# Patient Record
Sex: FEMALE | Race: WHITE | NOT HISPANIC OR LATINO | Employment: UNEMPLOYED | URBAN - METROPOLITAN AREA
[De-identification: names, ages, dates, MRNs, and addresses within clinical notes are randomized per-mention and may not be internally consistent; named-entity substitution may affect disease eponyms.]

---

## 2017-02-01 ENCOUNTER — CONVERSION ENCOUNTER (OUTPATIENT)
Dept: MAMMOGRAPHY | Facility: CLINIC | Age: 52
End: 2017-02-01

## 2017-06-23 ENCOUNTER — HOSPITAL ENCOUNTER (OUTPATIENT)
Dept: SLEEP CENTER | Facility: CLINIC | Age: 52
Discharge: HOME | End: 2017-06-23
Payer: COMMERCIAL

## 2017-06-23 ENCOUNTER — TRANSCRIBE ORDERS (OUTPATIENT)
Dept: SLEEP CENTER | Facility: CLINIC | Age: 52
End: 2017-06-23

## 2017-06-23 DIAGNOSIS — G47.33 OSA (OBSTRUCTIVE SLEEP APNEA): Primary | ICD-10-CM

## 2017-08-21 ENCOUNTER — HOSPITAL ENCOUNTER (OUTPATIENT)
Dept: SLEEP CENTER | Facility: CLINIC | Age: 52
Discharge: HOME | End: 2017-08-21
Payer: COMMERCIAL

## 2017-08-21 DIAGNOSIS — G47.33 OSA (OBSTRUCTIVE SLEEP APNEA): ICD-10-CM

## 2017-08-21 PROCEDURE — 95810 POLYSOM 6/> YRS 4/> PARAM: CPT

## 2017-08-22 ENCOUNTER — TRANSCRIBE ORDERS (OUTPATIENT)
Dept: SLEEP CENTER | Facility: CLINIC | Age: 52
End: 2017-08-22

## 2017-08-22 DIAGNOSIS — R09.02 HYPOXIA: Primary | ICD-10-CM

## 2017-09-15 ENCOUNTER — HOSPITAL ENCOUNTER (OUTPATIENT)
Dept: SLEEP CENTER | Facility: CLINIC | Age: 52
Discharge: HOME | End: 2017-09-15
Payer: COMMERCIAL

## 2017-09-15 DIAGNOSIS — R09.02 HYPOXIA: ICD-10-CM

## 2018-08-10 ENCOUNTER — APPOINTMENT (EMERGENCY)
Dept: RADIOLOGY | Facility: HOSPITAL | Age: 53
DRG: 812 | End: 2018-08-10
Payer: COMMERCIAL

## 2018-08-10 ENCOUNTER — APPOINTMENT (INPATIENT)
Dept: CT IMAGING | Facility: HOSPITAL | Age: 53
DRG: 812 | End: 2018-08-10
Payer: COMMERCIAL

## 2018-08-10 ENCOUNTER — HOSPITAL ENCOUNTER (INPATIENT)
Facility: HOSPITAL | Age: 53
LOS: 2 days | Discharge: HOME | DRG: 812 | End: 2018-08-12
Attending: EMERGENCY MEDICINE | Admitting: HOSPITALIST
Payer: COMMERCIAL

## 2018-08-10 DIAGNOSIS — G93.40 ACUTE ENCEPHALOPATHY: ICD-10-CM

## 2018-08-10 DIAGNOSIS — T50.901A OVERDOSE: Primary | ICD-10-CM

## 2018-08-10 DIAGNOSIS — F41.9 ANXIETY: ICD-10-CM

## 2018-08-10 DIAGNOSIS — G89.29 CHRONIC PAIN: ICD-10-CM

## 2018-08-10 PROBLEM — E66.09 OBESITY DUE TO EXCESS CALORIES: Status: ACTIVE | Noted: 2018-08-10

## 2018-08-10 PROBLEM — E66.09 OBESITY DUE TO EXCESS CALORIES: Chronic | Status: ACTIVE | Noted: 2018-08-10

## 2018-08-10 PROBLEM — G47.33 OBSTRUCTIVE SLEEP APNEA: Chronic | Status: ACTIVE | Noted: 2018-08-10

## 2018-08-10 PROBLEM — G47.33 OBSTRUCTIVE SLEEP APNEA: Status: ACTIVE | Noted: 2018-08-10

## 2018-08-10 PROBLEM — E66.09 OBESITY DUE TO EXCESS CALORIES: Chronic | Status: RESOLVED | Noted: 2018-08-10 | Resolved: 2018-08-10

## 2018-08-10 PROBLEM — J96.01 ACUTE RESPIRATORY FAILURE WITH HYPOXIA (HCC): Status: ACTIVE | Noted: 2018-08-10

## 2018-08-10 LAB
ALBUMIN SERPL BCP-MCNC: 3.4 G/DL (ref 3–5.2)
ALP SERPL-CCNC: 66 U/L (ref 43–122)
ALT SERPL W P-5'-P-CCNC: 24 U/L (ref 9–52)
AMPHETAMINES SERPL QL SCN: NEGATIVE
ANION GAP SERPL CALCULATED.3IONS-SCNC: 5 MMOL/L (ref 5–14)
ANISOCYTOSIS BLD QL SMEAR: PRESENT
APAP SERPL-MCNC: <10 UG/ML (ref 10–30)
AST SERPL W P-5'-P-CCNC: 19 U/L (ref 14–36)
BACTERIA UR QL AUTO: ABNORMAL /HPF
BARBITURATES UR QL: NEGATIVE
BENZODIAZ UR QL: POSITIVE
BILIRUB SERPL-MCNC: 0.4 MG/DL
BILIRUB UR QL STRIP: NEGATIVE
BUN SERPL-MCNC: 10 MG/DL (ref 5–25)
CALCIUM SERPL-MCNC: 9 MG/DL (ref 8.4–10.2)
CHLORIDE SERPL-SCNC: 100 MMOL/L (ref 97–108)
CLARITY UR: CLEAR
CO2 SERPL-SCNC: 37 MMOL/L (ref 22–30)
COCAINE UR QL: NEGATIVE
COLOR UR: ABNORMAL
CREAT SERPL-MCNC: 0.78 MG/DL (ref 0.6–1.2)
EOSINOPHIL # BLD AUTO: 0.13 THOUSAND/UL (ref 0–0.4)
EOSINOPHIL NFR BLD MANUAL: 2 % (ref 0–6)
ERYTHROCYTE [DISTWIDTH] IN BLOOD BY AUTOMATED COUNT: 16.5 %
EXT PREG TEST URINE: NORMAL
GFR SERPL CREATININE-BSD FRML MDRD: 88 ML/MIN/1.73SQ M
GLUCOSE SERPL-MCNC: 92 MG/DL (ref 70–99)
GLUCOSE UR STRIP-MCNC: NEGATIVE MG/DL
HCG SERPL QL: NEGATIVE
HCT VFR BLD AUTO: 46.6 % (ref 36–46)
HGB BLD-MCNC: 15.2 G/DL (ref 12–16)
HGB UR QL STRIP.AUTO: NEGATIVE
KETONES UR STRIP-MCNC: NEGATIVE MG/DL
LEUKOCYTE ESTERASE UR QL STRIP: NEGATIVE
LYMPHOCYTES # BLD AUTO: 3.07 THOUSAND/UL (ref 0.5–4)
LYMPHOCYTES # BLD AUTO: 48 % (ref 20–50)
MCH RBC QN AUTO: 32.1 PG (ref 26–34)
MCHC RBC AUTO-ENTMCNC: 32.6 G/DL (ref 31–36)
MCV RBC AUTO: 99 FL (ref 80–100)
METHADONE UR QL: NEGATIVE
MONOCYTES # BLD AUTO: 0.9 THOUSAND/UL (ref 0.2–0.9)
MONOCYTES NFR BLD AUTO: 14 % (ref 1–10)
NEUTS SEG # BLD: 2.3 THOUSAND/UL (ref 1.8–7.8)
NEUTS SEG NFR BLD AUTO: 36 %
NITRITE UR QL STRIP: NEGATIVE
NON-SQ EPI CELLS URNS QL MICRO: ABNORMAL /HPF
OPIATES UR QL SCN: POSITIVE
PCP UR QL: NEGATIVE
PH UR STRIP.AUTO: 8 [PH] (ref 4.5–8)
PLATELET # BLD AUTO: 159 THOUSANDS/UL (ref 150–450)
PLATELET BLD QL SMEAR: ADEQUATE
PMV BLD AUTO: 9.6 FL (ref 8.9–12.7)
POTASSIUM SERPL-SCNC: 3.7 MMOL/L (ref 3.6–5)
PROT SERPL-MCNC: 6.6 G/DL (ref 5.9–8.4)
PROT UR STRIP-MCNC: NEGATIVE MG/DL
RBC # BLD AUTO: 4.73 MILLION/UL (ref 4–5.2)
RBC #/AREA URNS AUTO: ABNORMAL /HPF
RBC MORPH BLD: ABNORMAL
SALICYLATES SERPL-MCNC: <1 MG/DL (ref 10–30)
SODIUM SERPL-SCNC: 142 MMOL/L (ref 137–147)
SP GR UR STRIP.AUTO: 1.01 (ref 1–1.04)
THC UR QL: NEGATIVE
TOTAL CELLS COUNTED SPEC: 100
UROBILINOGEN UA: NEGATIVE MG/DL
WBC # BLD AUTO: 6.4 THOUSAND/UL (ref 4.5–11)
WBC #/AREA URNS AUTO: ABNORMAL /HPF

## 2018-08-10 PROCEDURE — 84703 CHORIONIC GONADOTROPIN ASSAY: CPT | Performed by: EMERGENCY MEDICINE

## 2018-08-10 PROCEDURE — 70450 CT HEAD/BRAIN W/O DYE: CPT

## 2018-08-10 PROCEDURE — 80307 DRUG TEST PRSMV CHEM ANLYZR: CPT | Performed by: EMERGENCY MEDICINE

## 2018-08-10 PROCEDURE — 71045 X-RAY EXAM CHEST 1 VIEW: CPT

## 2018-08-10 PROCEDURE — 36415 COLL VENOUS BLD VENIPUNCTURE: CPT | Performed by: EMERGENCY MEDICINE

## 2018-08-10 PROCEDURE — 99285 EMERGENCY DEPT VISIT HI MDM: CPT

## 2018-08-10 PROCEDURE — 81001 URINALYSIS AUTO W/SCOPE: CPT | Performed by: EMERGENCY MEDICINE

## 2018-08-10 PROCEDURE — 96376 TX/PRO/DX INJ SAME DRUG ADON: CPT

## 2018-08-10 PROCEDURE — 80329 ANALGESICS NON-OPIOID 1 OR 2: CPT | Performed by: EMERGENCY MEDICINE

## 2018-08-10 PROCEDURE — 99291 CRITICAL CARE FIRST HOUR: CPT | Performed by: HOSPITALIST

## 2018-08-10 PROCEDURE — 80053 COMPREHEN METABOLIC PANEL: CPT | Performed by: EMERGENCY MEDICINE

## 2018-08-10 PROCEDURE — 85007 BL SMEAR W/DIFF WBC COUNT: CPT | Performed by: EMERGENCY MEDICINE

## 2018-08-10 PROCEDURE — 85027 COMPLETE CBC AUTOMATED: CPT | Performed by: EMERGENCY MEDICINE

## 2018-08-10 PROCEDURE — 81025 URINE PREGNANCY TEST: CPT | Performed by: EMERGENCY MEDICINE

## 2018-08-10 PROCEDURE — 96365 THER/PROPH/DIAG IV INF INIT: CPT

## 2018-08-10 RX ORDER — DEXTROSE AND SODIUM CHLORIDE 5; .45 G/100ML; G/100ML
75 INJECTION, SOLUTION INTRAVENOUS CONTINUOUS
Status: DISPENSED | OUTPATIENT
Start: 2018-08-10 | End: 2018-08-11

## 2018-08-10 RX ORDER — OXYCODONE HYDROCHLORIDE 5 MG/1
5 CAPSULE ORAL 4 TIMES DAILY
Status: ON HOLD | COMMUNITY
End: 2018-08-12

## 2018-08-10 RX ORDER — ALPRAZOLAM 1 MG/1
0.25 TABLET ORAL 3 TIMES DAILY
Status: ON HOLD | COMMUNITY
Start: 2015-12-30 | End: 2018-08-12

## 2018-08-10 RX ORDER — BUSPIRONE HYDROCHLORIDE 15 MG/1
30 TABLET ORAL 2 TIMES DAILY
COMMUNITY
Start: 2015-12-30

## 2018-08-10 RX ORDER — OXYCODONE HYDROCHLORIDE 20 MG/1
20 TABLET ORAL EVERY 4 HOURS PRN
Status: ON HOLD | COMMUNITY
End: 2018-08-12 | Stop reason: CLARIF

## 2018-08-10 RX ORDER — NALOXONE HYDROCHLORIDE 0.4 MG/ML
INJECTION, SOLUTION INTRAMUSCULAR; INTRAVENOUS; SUBCUTANEOUS
Status: COMPLETED
Start: 2018-08-10 | End: 2018-08-10

## 2018-08-10 RX ORDER — POLYETHYLENE GLYCOL 3350 17 G/17G
17 POWDER, FOR SOLUTION ORAL DAILY PRN
Status: DISCONTINUED | OUTPATIENT
Start: 2018-08-10 | End: 2018-08-12 | Stop reason: HOSPADM

## 2018-08-10 RX ORDER — ASENAPINE 10 MG/1
10 TABLET SUBLINGUAL ONCE
COMMUNITY

## 2018-08-10 RX ORDER — ONDANSETRON 2 MG/ML
4 INJECTION INTRAMUSCULAR; INTRAVENOUS EVERY 6 HOURS PRN
Status: DISCONTINUED | OUTPATIENT
Start: 2018-08-10 | End: 2018-08-12 | Stop reason: HOSPADM

## 2018-08-10 RX ORDER — PANTOPRAZOLE SODIUM 40 MG/1
40 TABLET, DELAYED RELEASE ORAL
Status: DISCONTINUED | OUTPATIENT
Start: 2018-08-11 | End: 2018-08-12 | Stop reason: HOSPADM

## 2018-08-10 RX ORDER — SENNOSIDES 8.6 MG
1 TABLET ORAL DAILY
Status: DISCONTINUED | OUTPATIENT
Start: 2018-08-11 | End: 2018-08-12 | Stop reason: HOSPADM

## 2018-08-10 RX ORDER — HYDROXYZINE PAMOATE 25 MG/1
25 CAPSULE ORAL DAILY PRN
COMMUNITY
End: 2018-08-12 | Stop reason: HOSPADM

## 2018-08-10 RX ORDER — NALOXONE HYDROCHLORIDE 1 MG/ML
1 INJECTION INTRAMUSCULAR; INTRAVENOUS; SUBCUTANEOUS ONCE
Status: COMPLETED | OUTPATIENT
Start: 2018-08-10 | End: 2018-08-10

## 2018-08-10 RX ORDER — NICOTINE 21 MG/24HR
1 PATCH, TRANSDERMAL 24 HOURS TRANSDERMAL DAILY
Status: DISCONTINUED | OUTPATIENT
Start: 2018-08-10 | End: 2018-08-12 | Stop reason: HOSPADM

## 2018-08-10 RX ORDER — NALOXONE HYDROCHLORIDE 1 MG/ML
INJECTION INTRAMUSCULAR; INTRAVENOUS; SUBCUTANEOUS
Status: COMPLETED
Start: 2018-08-10 | End: 2018-08-10

## 2018-08-10 RX ORDER — NALOXONE HYDROCHLORIDE 0.4 MG/ML
0.4 INJECTION, SOLUTION INTRAMUSCULAR; INTRAVENOUS; SUBCUTANEOUS ONCE
Status: COMPLETED | OUTPATIENT
Start: 2018-08-10 | End: 2018-08-10

## 2018-08-10 RX ORDER — DIVALPROEX SODIUM 500 MG/1
1000 TABLET, DELAYED RELEASE ORAL
COMMUNITY

## 2018-08-10 RX ORDER — NALOXONE HYDROCHLORIDE 0.4 MG/ML
0.1 INJECTION, SOLUTION INTRAMUSCULAR; INTRAVENOUS; SUBCUTANEOUS ONCE
Status: COMPLETED | OUTPATIENT
Start: 2018-08-10 | End: 2018-08-10

## 2018-08-10 RX ORDER — NALOXONE HYDROCHLORIDE 0.4 MG/ML
0.3 INJECTION, SOLUTION INTRAMUSCULAR; INTRAVENOUS; SUBCUTANEOUS ONCE
Status: COMPLETED | OUTPATIENT
Start: 2018-08-10 | End: 2018-08-10

## 2018-08-10 RX ORDER — ACETAMINOPHEN 325 MG/1
650 TABLET ORAL EVERY 6 HOURS PRN
Status: DISCONTINUED | OUTPATIENT
Start: 2018-08-10 | End: 2018-08-12 | Stop reason: HOSPADM

## 2018-08-10 RX ORDER — MULTIVIT-MIN/IRON/FOLIC ACID/K 18-600-40
1000 CAPSULE ORAL DAILY
COMMUNITY

## 2018-08-10 RX ORDER — DOCUSATE SODIUM 100 MG/1
100 CAPSULE, LIQUID FILLED ORAL 2 TIMES DAILY
Status: DISCONTINUED | OUTPATIENT
Start: 2018-08-10 | End: 2018-08-12 | Stop reason: HOSPADM

## 2018-08-10 RX ORDER — DULOXETIN HYDROCHLORIDE 60 MG/1
60 CAPSULE, DELAYED RELEASE ORAL DAILY
COMMUNITY

## 2018-08-10 RX ADMIN — NICOTINE 1 PATCH: 14 PATCH TRANSDERMAL at 22:26

## 2018-08-10 RX ADMIN — DOCUSATE SODIUM 100 MG: 100 CAPSULE, LIQUID FILLED ORAL at 22:26

## 2018-08-10 RX ADMIN — NALOXONE HYDROCHLORIDE 1 MG/HR: 1 INJECTION PARENTERAL at 19:03

## 2018-08-10 RX ADMIN — NALOXONE HYDROCHLORIDE 0.3 MG: 0.4 INJECTION, SOLUTION INTRAMUSCULAR; INTRAVENOUS; SUBCUTANEOUS at 18:37

## 2018-08-10 RX ADMIN — NALOXONE HYDROCHLORIDE 1 MG: 1 INJECTION INTRAMUSCULAR; INTRAVENOUS; SUBCUTANEOUS at 19:12

## 2018-08-10 RX ADMIN — NALOXONE HYDROCHLORIDE 0.1 MG: 0.4 INJECTION, SOLUTION INTRAMUSCULAR; INTRAVENOUS; SUBCUTANEOUS at 18:19

## 2018-08-10 RX ADMIN — DEXTROSE AND SODIUM CHLORIDE 75 ML/HR: 5; 450 INJECTION, SOLUTION INTRAVENOUS at 22:25

## 2018-08-10 RX ADMIN — NALOXONE HYDROCHLORIDE 0.4 MG: 0.4 INJECTION, SOLUTION INTRAMUSCULAR; INTRAVENOUS; SUBCUTANEOUS at 18:25

## 2018-08-10 RX ADMIN — NALOXONE HYDROCHLORIDE 1 MG: 1 INJECTION PARENTERAL at 19:12

## 2018-08-10 RX ADMIN — NALOXONE HYDROCHLORIDE 2 MG/HR: 1 INJECTION PARENTERAL at 20:14

## 2018-08-11 ENCOUNTER — APPOINTMENT (INPATIENT)
Dept: RADIOLOGY | Facility: HOSPITAL | Age: 53
DRG: 812 | End: 2018-08-11
Payer: COMMERCIAL

## 2018-08-11 LAB
ALBUMIN SERPL BCP-MCNC: 3.4 G/DL (ref 3–5.2)
ALP SERPL-CCNC: 63 U/L (ref 43–122)
ALT SERPL W P-5'-P-CCNC: 17 U/L (ref 9–52)
ANION GAP SERPL CALCULATED.3IONS-SCNC: 5 MMOL/L (ref 5–14)
AST SERPL W P-5'-P-CCNC: 31 U/L (ref 14–36)
BILIRUB SERPL-MCNC: 0.5 MG/DL
BUN SERPL-MCNC: 9 MG/DL (ref 5–25)
CALCIUM SERPL-MCNC: 9 MG/DL (ref 8.4–10.2)
CHLORIDE SERPL-SCNC: 101 MMOL/L (ref 97–108)
CO2 SERPL-SCNC: 34 MMOL/L (ref 22–30)
CREAT SERPL-MCNC: 0.66 MG/DL (ref 0.6–1.2)
ERYTHROCYTE [DISTWIDTH] IN BLOOD BY AUTOMATED COUNT: 16.2 %
GFR SERPL CREATININE-BSD FRML MDRD: 102 ML/MIN/1.73SQ M
GLUCOSE SERPL-MCNC: 103 MG/DL (ref 70–99)
HCT VFR BLD AUTO: 49.9 % (ref 36–46)
HGB BLD-MCNC: 16.4 G/DL (ref 12–16)
INR PPP: 0.96 (ref 0.89–1.1)
MAGNESIUM SERPL-MCNC: 2.1 MG/DL (ref 1.6–2.3)
MCH RBC QN AUTO: 31.7 PG (ref 26–34)
MCHC RBC AUTO-ENTMCNC: 32.8 G/DL (ref 31–36)
MCV RBC AUTO: 97 FL (ref 80–100)
PHOSPHATE SERPL-MCNC: 4.4 MG/DL (ref 2.5–4.8)
PLATELET # BLD AUTO: 156 THOUSANDS/UL (ref 150–450)
PMV BLD AUTO: 9.8 FL (ref 8.9–12.7)
POTASSIUM SERPL-SCNC: 4.2 MMOL/L (ref 3.6–5)
PROT SERPL-MCNC: 6.7 G/DL (ref 5.9–8.4)
PROTHROMBIN TIME: 10.2 SECONDS (ref 9.5–11.6)
RBC # BLD AUTO: 5.16 MILLION/UL (ref 4–5.2)
SODIUM SERPL-SCNC: 140 MMOL/L (ref 137–147)
TSH SERPL DL<=0.05 MIU/L-ACNC: 0.32 UIU/ML (ref 0.47–4.68)
WBC # BLD AUTO: 3.9 THOUSAND/UL (ref 4.5–11)

## 2018-08-11 PROCEDURE — 73090 X-RAY EXAM OF FOREARM: CPT

## 2018-08-11 PROCEDURE — 84443 ASSAY THYROID STIM HORMONE: CPT | Performed by: HOSPITALIST

## 2018-08-11 PROCEDURE — 94640 AIRWAY INHALATION TREATMENT: CPT

## 2018-08-11 PROCEDURE — 83735 ASSAY OF MAGNESIUM: CPT | Performed by: HOSPITALIST

## 2018-08-11 PROCEDURE — 84100 ASSAY OF PHOSPHORUS: CPT | Performed by: HOSPITALIST

## 2018-08-11 PROCEDURE — 85027 COMPLETE CBC AUTOMATED: CPT | Performed by: HOSPITALIST

## 2018-08-11 PROCEDURE — 80053 COMPREHEN METABOLIC PANEL: CPT | Performed by: HOSPITALIST

## 2018-08-11 PROCEDURE — 99233 SBSQ HOSP IP/OBS HIGH 50: CPT | Performed by: HOSPITALIST

## 2018-08-11 PROCEDURE — 94664 DEMO&/EVAL PT USE INHALER: CPT

## 2018-08-11 PROCEDURE — 73100 X-RAY EXAM OF WRIST: CPT

## 2018-08-11 PROCEDURE — 94762 N-INVAS EAR/PLS OXIMTRY CONT: CPT

## 2018-08-11 PROCEDURE — 85610 PROTHROMBIN TIME: CPT | Performed by: HOSPITALIST

## 2018-08-11 PROCEDURE — 94760 N-INVAS EAR/PLS OXIMETRY 1: CPT

## 2018-08-11 PROCEDURE — 99252 IP/OBS CONSLTJ NEW/EST SF 35: CPT | Performed by: PSYCHIATRY & NEUROLOGY

## 2018-08-11 RX ORDER — IPRATROPIUM BROMIDE AND ALBUTEROL SULFATE 2.5; .5 MG/3ML; MG/3ML
3 SOLUTION RESPIRATORY (INHALATION) EVERY 6 HOURS PRN
Status: DISCONTINUED | OUTPATIENT
Start: 2018-08-11 | End: 2018-08-12 | Stop reason: HOSPADM

## 2018-08-11 RX ORDER — DEXTROSE AND SODIUM CHLORIDE 5; .45 G/100ML; G/100ML
75 INJECTION, SOLUTION INTRAVENOUS CONTINUOUS
Status: DISCONTINUED | OUTPATIENT
Start: 2018-08-11 | End: 2018-08-12 | Stop reason: HOSPADM

## 2018-08-11 RX ORDER — MELATONIN
1000 DAILY
Status: DISCONTINUED | OUTPATIENT
Start: 2018-08-11 | End: 2018-08-12 | Stop reason: HOSPADM

## 2018-08-11 RX ADMIN — ACETAMINOPHEN 650 MG: 325 TABLET ORAL at 12:43

## 2018-08-11 RX ADMIN — ENOXAPARIN SODIUM 40 MG: 40 INJECTION SUBCUTANEOUS at 09:20

## 2018-08-11 RX ADMIN — PANTOPRAZOLE SODIUM 40 MG: 40 TABLET, DELAYED RELEASE ORAL at 06:49

## 2018-08-11 RX ADMIN — NICOTINE 1 PATCH: 14 PATCH TRANSDERMAL at 09:19

## 2018-08-11 RX ADMIN — DOCUSATE SODIUM 100 MG: 100 CAPSULE, LIQUID FILLED ORAL at 09:19

## 2018-08-11 RX ADMIN — VITAMIN D, TAB 1000IU (100/BT) 1000 UNITS: 25 TAB at 09:19

## 2018-08-11 RX ADMIN — ACETAMINOPHEN 650 MG: 325 TABLET ORAL at 20:18

## 2018-08-11 RX ADMIN — IPRATROPIUM BROMIDE AND ALBUTEROL SULFATE 3 ML: .5; 3 SOLUTION RESPIRATORY (INHALATION) at 21:50

## 2018-08-11 RX ADMIN — SENNOSIDES 8.6 MG: 8.6 TABLET, FILM COATED ORAL at 09:19

## 2018-08-11 RX ADMIN — DEXTROSE AND SODIUM CHLORIDE 75 ML/HR: 5; 450 INJECTION, SOLUTION INTRAVENOUS at 12:44

## 2018-08-11 RX ADMIN — IPRATROPIUM BROMIDE AND ALBUTEROL SULFATE 3 ML: .5; 3 SOLUTION RESPIRATORY (INHALATION) at 16:27

## 2018-08-11 RX ADMIN — DOCUSATE SODIUM 100 MG: 100 CAPSULE, LIQUID FILLED ORAL at 17:31

## 2018-08-12 VITALS
BODY MASS INDEX: 27.39 KG/M2 | TEMPERATURE: 98.3 F | RESPIRATION RATE: 18 BRPM | OXYGEN SATURATION: 96 % | WEIGHT: 184.9 LBS | DIASTOLIC BLOOD PRESSURE: 91 MMHG | HEIGHT: 69 IN | HEART RATE: 73 BPM | SYSTOLIC BLOOD PRESSURE: 168 MMHG

## 2018-08-12 LAB
ANION GAP SERPL CALCULATED.3IONS-SCNC: 8 MMOL/L (ref 5–14)
BASOPHILS # BLD AUTO: 0.1 THOUSANDS/ΜL (ref 0–0.1)
BASOPHILS NFR BLD AUTO: 1 % (ref 0–1)
BUN SERPL-MCNC: 10 MG/DL (ref 5–25)
CALCIUM SERPL-MCNC: 9.5 MG/DL (ref 8.4–10.2)
CHLORIDE SERPL-SCNC: 102 MMOL/L (ref 97–108)
CO2 SERPL-SCNC: 28 MMOL/L (ref 22–30)
CREAT SERPL-MCNC: 0.61 MG/DL (ref 0.6–1.2)
EOSINOPHIL # BLD AUTO: 0 THOUSAND/ΜL (ref 0–0.4)
EOSINOPHIL NFR BLD AUTO: 0 % (ref 0–6)
ERYTHROCYTE [DISTWIDTH] IN BLOOD BY AUTOMATED COUNT: 16.4 %
GFR SERPL CREATININE-BSD FRML MDRD: 105 ML/MIN/1.73SQ M
GLUCOSE SERPL-MCNC: 110 MG/DL (ref 70–99)
HCT VFR BLD AUTO: 47 % (ref 36–46)
HGB BLD-MCNC: 16 G/DL (ref 12–16)
LYMPHOCYTES # BLD AUTO: 1.4 THOUSANDS/ΜL (ref 0.5–4)
LYMPHOCYTES NFR BLD AUTO: 28 % (ref 20–50)
MAGNESIUM SERPL-MCNC: 2.3 MG/DL (ref 1.6–2.3)
MCH RBC QN AUTO: 32.5 PG (ref 26–34)
MCHC RBC AUTO-ENTMCNC: 34 G/DL (ref 31–36)
MCV RBC AUTO: 96 FL (ref 80–100)
MONOCYTES # BLD AUTO: 0.5 THOUSAND/ΜL (ref 0.2–0.9)
MONOCYTES NFR BLD AUTO: 11 % (ref 1–10)
NEUTROPHILS # BLD AUTO: 3 THOUSANDS/ΜL (ref 1.8–7.8)
NEUTS SEG NFR BLD AUTO: 60 % (ref 45–65)
PLATELET # BLD AUTO: 175 THOUSANDS/UL (ref 150–450)
PMV BLD AUTO: 9.6 FL (ref 8.9–12.7)
POTASSIUM SERPL-SCNC: 5.1 MMOL/L (ref 3.6–5)
RBC # BLD AUTO: 4.92 MILLION/UL (ref 4–5.2)
SODIUM SERPL-SCNC: 138 MMOL/L (ref 137–147)
WBC # BLD AUTO: 5.1 THOUSAND/UL (ref 4.5–11)

## 2018-08-12 PROCEDURE — 83735 ASSAY OF MAGNESIUM: CPT | Performed by: HOSPITALIST

## 2018-08-12 PROCEDURE — 99239 HOSP IP/OBS DSCHRG MGMT >30: CPT | Performed by: HOSPITALIST

## 2018-08-12 PROCEDURE — 85025 COMPLETE CBC W/AUTO DIFF WBC: CPT | Performed by: HOSPITALIST

## 2018-08-12 PROCEDURE — 94760 N-INVAS EAR/PLS OXIMETRY 1: CPT

## 2018-08-12 PROCEDURE — 94640 AIRWAY INHALATION TREATMENT: CPT

## 2018-08-12 PROCEDURE — 80048 BASIC METABOLIC PNL TOTAL CA: CPT | Performed by: HOSPITALIST

## 2018-08-12 PROCEDURE — 90732 PPSV23 VACC 2 YRS+ SUBQ/IM: CPT | Performed by: HOSPITALIST

## 2018-08-12 RX ORDER — ALPRAZOLAM 0.5 MG/1
0.25 TABLET ORAL 2 TIMES DAILY PRN
Qty: 30 TABLET | Refills: 0 | Status: SHIPPED | OUTPATIENT
Start: 2018-08-12 | End: 2018-08-22

## 2018-08-12 RX ORDER — TRAMADOL HYDROCHLORIDE 50 MG/1
TABLET ORAL
Status: COMPLETED
Start: 2018-08-12 | End: 2018-08-12

## 2018-08-12 RX ORDER — PREGABALIN 50 MG/1
50 CAPSULE ORAL 2 TIMES DAILY
Qty: 10 CAPSULE | Refills: 0 | Status: SHIPPED | OUTPATIENT
Start: 2018-08-12 | End: 2018-08-12 | Stop reason: HOSPADM

## 2018-08-12 RX ORDER — OXYCODONE HCL 20 MG/1
20 TABLET, FILM COATED, EXTENDED RELEASE ORAL EVERY 12 HOURS SCHEDULED
Status: ON HOLD | COMMUNITY
End: 2018-08-12

## 2018-08-12 RX ORDER — CYCLOBENZAPRINE HCL 5 MG
5 TABLET ORAL 3 TIMES DAILY PRN
Qty: 30 TABLET | Refills: 0 | Status: SHIPPED | OUTPATIENT
Start: 2018-08-12 | End: 2018-08-12 | Stop reason: HOSPADM

## 2018-08-12 RX ORDER — CYCLOBENZAPRINE HCL 5 MG
10 TABLET ORAL 3 TIMES DAILY PRN
Status: ON HOLD | COMMUNITY
End: 2018-08-12

## 2018-08-12 RX ORDER — OXYCODONE HYDROCHLORIDE 5 MG/1
5 CAPSULE ORAL 2 TIMES DAILY PRN
Qty: 40 CAPSULE | Refills: 0
Start: 2018-08-12 | End: 2018-08-22

## 2018-08-12 RX ORDER — TRAMADOL HYDROCHLORIDE 50 MG/1
50 TABLET ORAL EVERY 6 HOURS PRN
Status: DISCONTINUED | OUTPATIENT
Start: 2018-08-12 | End: 2018-08-12 | Stop reason: HOSPADM

## 2018-08-12 RX ORDER — OXYCODONE HCL 20 MG/1
20 TABLET, FILM COATED, EXTENDED RELEASE ORAL EVERY 12 HOURS SCHEDULED
Qty: 1 TABLET | Refills: 0
Start: 2018-08-12 | End: 2018-08-13

## 2018-08-12 RX ORDER — PREGABALIN 150 MG/1
150 CAPSULE ORAL 2 TIMES DAILY
COMMUNITY
End: 2018-08-12 | Stop reason: HOSPADM

## 2018-08-12 RX ADMIN — TRAMADOL HYDROCHLORIDE 50 MG: 50 TABLET, COATED ORAL at 06:26

## 2018-08-12 RX ADMIN — ENOXAPARIN SODIUM 40 MG: 40 INJECTION SUBCUTANEOUS at 08:53

## 2018-08-12 RX ADMIN — PANTOPRAZOLE SODIUM 40 MG: 40 TABLET, DELAYED RELEASE ORAL at 06:26

## 2018-08-12 RX ADMIN — NICOTINE 1 PATCH: 14 PATCH TRANSDERMAL at 08:55

## 2018-08-12 RX ADMIN — VITAMIN D, TAB 1000IU (100/BT) 1000 UNITS: 25 TAB at 08:53

## 2018-08-12 RX ADMIN — PNEUMOCOCCAL VACCINE POLYVALENT 0.5 ML
25; 25; 25; 25; 25; 25; 25; 25; 25; 25; 25; 25; 25; 25; 25; 25; 25; 25; 25; 25; 25; 25; 25 INJECTION, SOLUTION INTRAMUSCULAR; SUBCUTANEOUS at 11:37

## 2018-08-12 RX ADMIN — TRAMADOL HYDROCHLORIDE 50 MG: 50 TABLET, COATED ORAL at 00:08

## 2018-08-12 RX ADMIN — IPRATROPIUM BROMIDE AND ALBUTEROL SULFATE 3 ML: .5; 3 SOLUTION RESPIRATORY (INHALATION) at 11:18

## 2018-08-12 RX ADMIN — DEXTROSE AND SODIUM CHLORIDE 75 ML/HR: 5; 450 INJECTION, SOLUTION INTRAVENOUS at 00:09

## 2018-12-27 ENCOUNTER — TRANSCRIBE ORDERS (OUTPATIENT)
Dept: ADMINISTRATIVE | Facility: HOSPITAL | Age: 53
End: 2018-12-27

## 2019-03-12 ENCOUNTER — TRANSCRIBE ORDERS (OUTPATIENT)
Dept: ADMINISTRATIVE | Facility: HOSPITAL | Age: 54
End: 2019-03-12

## 2019-03-12 ENCOUNTER — APPOINTMENT (OUTPATIENT)
Dept: LAB | Facility: HOSPITAL | Age: 54
End: 2019-03-12
Payer: COMMERCIAL

## 2019-03-12 DIAGNOSIS — M47.812 OSTEOARTHRITIS OF CERVICAL SPINE, UNSPECIFIED SPINAL OSTEOARTHRITIS COMPLICATION STATUS: ICD-10-CM

## 2019-03-12 DIAGNOSIS — M79.7 FIBROMYALGIA, PRIMARY: ICD-10-CM

## 2019-03-12 DIAGNOSIS — M54.2 CERVICALGIA: ICD-10-CM

## 2019-03-12 DIAGNOSIS — M54.12 CERVICAL RADICULITIS: Primary | ICD-10-CM

## 2019-03-12 DIAGNOSIS — M54.5 LOW BACK PAIN, UNSPECIFIED BACK PAIN LATERALITY, UNSPECIFIED CHRONICITY, WITH SCIATICA PRESENCE UNSPECIFIED: ICD-10-CM

## 2019-03-12 DIAGNOSIS — M54.12 CERVICAL RADICULITIS: ICD-10-CM

## 2019-03-12 DIAGNOSIS — E55.9 VITAMIN D DEFICIENCY: ICD-10-CM

## 2019-03-12 DIAGNOSIS — R53.83 FATIGUE, UNSPECIFIED TYPE: ICD-10-CM

## 2019-03-12 LAB
ALBUMIN SERPL BCP-MCNC: 3.9 G/DL (ref 3–5.2)
ALP SERPL-CCNC: 59 U/L (ref 43–122)
ALT SERPL W P-5'-P-CCNC: 9 U/L (ref 9–52)
ANION GAP SERPL CALCULATED.3IONS-SCNC: 5 MMOL/L (ref 5–14)
AST SERPL W P-5'-P-CCNC: 14 U/L (ref 14–36)
BASOPHILS # BLD AUTO: 0 THOUSANDS/ΜL (ref 0–0.1)
BASOPHILS NFR BLD AUTO: 1 % (ref 0–1)
BILIRUB SERPL-MCNC: 0.3 MG/DL
BUN SERPL-MCNC: 8 MG/DL (ref 5–25)
CALCIUM SERPL-MCNC: 9.3 MG/DL (ref 8.4–10.2)
CHLORIDE SERPL-SCNC: 101 MMOL/L (ref 97–108)
CO2 SERPL-SCNC: 32 MMOL/L (ref 22–30)
CREAT SERPL-MCNC: 1.01 MG/DL (ref 0.6–1.2)
CRP SERPL QL: <3 MG/L
EOSINOPHIL # BLD AUTO: 0.1 THOUSAND/ΜL (ref 0–0.4)
EOSINOPHIL NFR BLD AUTO: 2 % (ref 0–6)
ERYTHROCYTE [DISTWIDTH] IN BLOOD BY AUTOMATED COUNT: 16.2 %
ERYTHROCYTE [SEDIMENTATION RATE] IN BLOOD: 8 MM/HOUR (ref 1–20)
GFR SERPL CREATININE-BSD FRML MDRD: 64 ML/MIN/1.73SQ M
GLUCOSE SERPL-MCNC: 77 MG/DL (ref 70–99)
HCT VFR BLD AUTO: 44.3 % (ref 36–46)
HGB BLD-MCNC: 14.2 G/DL (ref 12–16)
LYMPHOCYTES # BLD AUTO: 2.6 THOUSANDS/ΜL (ref 0.5–4)
LYMPHOCYTES NFR BLD AUTO: 53 % (ref 25–45)
MCH RBC QN AUTO: 29.5 PG (ref 26–34)
MCHC RBC AUTO-ENTMCNC: 32.1 G/DL (ref 31–36)
MCV RBC AUTO: 92 FL (ref 80–100)
MONOCYTES # BLD AUTO: 0.3 THOUSAND/ΜL (ref 0.2–0.9)
MONOCYTES NFR BLD AUTO: 7 % (ref 1–10)
NEUTROPHILS # BLD AUTO: 1.9 THOUSANDS/ΜL (ref 1.8–7.8)
NEUTS SEG NFR BLD AUTO: 39 % (ref 45–65)
PLATELET # BLD AUTO: 199 THOUSANDS/UL (ref 150–450)
PMV BLD AUTO: 9.9 FL (ref 8.9–12.7)
POTASSIUM SERPL-SCNC: 4.2 MMOL/L (ref 3.6–5)
PROT SERPL-MCNC: 6.5 G/DL (ref 5.9–8.4)
RBC # BLD AUTO: 4.83 MILLION/UL (ref 4–5.2)
SODIUM SERPL-SCNC: 138 MMOL/L (ref 137–147)
TSH SERPL DL<=0.05 MIU/L-ACNC: 0.5 UIU/ML (ref 0.47–4.68)
WBC # BLD AUTO: 5 THOUSAND/UL (ref 4.5–11)

## 2019-03-12 PROCEDURE — 36415 COLL VENOUS BLD VENIPUNCTURE: CPT

## 2019-03-12 PROCEDURE — 86140 C-REACTIVE PROTEIN: CPT

## 2019-03-12 PROCEDURE — 85025 COMPLETE CBC W/AUTO DIFF WBC: CPT

## 2019-03-12 PROCEDURE — 82306 VITAMIN D 25 HYDROXY: CPT

## 2019-03-12 PROCEDURE — 80053 COMPREHEN METABOLIC PANEL: CPT

## 2019-03-12 PROCEDURE — 84443 ASSAY THYROID STIM HORMONE: CPT

## 2019-03-12 PROCEDURE — 85652 RBC SED RATE AUTOMATED: CPT

## 2019-03-13 LAB — 25(OH)D3 SERPL-MCNC: 42.9 NG/ML (ref 30–100)

## 2019-08-28 PROBLEM — F17.210 CIGARETTE SMOKER: Status: ACTIVE | Noted: 2017-12-20

## 2019-08-28 PROBLEM — L02.619 CELLULITIS AND ABSCESS OF FOOT: Status: ACTIVE | Noted: 2019-08-01

## 2019-08-28 PROBLEM — J43.9 PULMONARY EMPHYSEMA (HCC): Status: ACTIVE | Noted: 2017-12-20

## 2019-08-28 PROBLEM — R91.1 PULMONARY NODULE: Status: ACTIVE | Noted: 2017-12-20

## 2019-08-28 PROBLEM — R06.02 SHORTNESS OF BREATH: Status: ACTIVE | Noted: 2017-12-20

## 2019-08-28 PROBLEM — L03.119 CELLULITIS AND ABSCESS OF FOOT: Status: ACTIVE | Noted: 2019-08-01

## 2019-08-28 PROBLEM — G47.34 NOCTURNAL HYPOXEMIA: Status: ACTIVE | Noted: 2017-12-20

## 2019-12-11 ENCOUNTER — EVALUATION (OUTPATIENT)
Dept: PHYSICAL THERAPY | Age: 54
End: 2019-12-11
Payer: COMMERCIAL

## 2019-12-11 ENCOUNTER — TRANSCRIBE ORDERS (OUTPATIENT)
Dept: PHYSICAL THERAPY | Age: 54
End: 2019-12-11

## 2019-12-11 DIAGNOSIS — M54.16 LUMBAR RADICULOPATHY: Primary | ICD-10-CM

## 2019-12-11 DIAGNOSIS — M54.14 RADICULOPATHY, THORACIC REGION: ICD-10-CM

## 2019-12-11 DIAGNOSIS — M54.12 RADICULOPATHY OF CERVICAL SPINE: ICD-10-CM

## 2019-12-11 PROCEDURE — 97163 PT EVAL HIGH COMPLEX 45 MIN: CPT | Performed by: PHYSICAL THERAPIST

## 2019-12-11 NOTE — PROGRESS NOTES
PT Evaluation     Today's date: 2019  Patient name: Dany Machuca  : 1965  MRN: [id number]  Referring provider: Margarita Stover MD  Dx:   Encounter Diagnosis     ICD-10-CM    1  Lumbar radiculopathy M54 16    2  Radiculopathy of cervical spine M54 12    3  Radiculopathy, thoracic region M54 14                   Assessment  Assessment details: PT IE: 2019  Patient reported she has the following symptoms: cervical, lumbar and bilateral scapular and UT region pain  Patient noted her most recent bout of injections were not successful in pain reduction thus they will hold injections currently  Patient noted as soon as she is standing for 5 minutes she has severe central Lumbar spine region pain  Patient noted she uses spc all the time for pain reduction  Patient noted she feels more stable and secure with NBQC than spc  Patient noted she has had multiple falls over the past year  Patient noted she fell in the spring of 2019 and fractured her right ankle  Patient noted she had a bout of left distal le cellulitis in the late spring / early summer  Patient noted she has bilateral CTS  Patient noted she has pain in posterior left knee and thigh pain  Patient noted her bilateral ue and le are weak due to cervical and lumbar spine weakness  Patient noted sleep is deprived due to cervical and lumbar spine pain  Patient noted walking and stair climbing are limited by cervical and lumbar spine pain and bilateral le weakness  Patient noted she has had PT in the past in which she had land based PT at [facility]  Patient noted bending, squatting, reaching, lifting, dressing and standing based functional activities are all limited by cervical and lumbar spine pain aggravation  Patient noted use of stronger pain medication as the most effective means of short term pain reduction  Patient noted noted she lies down to sleep in side lying with left more comfortable than right    Patient denies recent diagnostic testing  Patient noted MHP is effective in short term pain reduction  Impairments: abnormal gait, abnormal or restricted ROM, abnormal movement, impaired physical strength, pain with function and safety issue  Understanding of Dx/Px/POC: good   Prognosis: fair  Prognosis details: Patient is a 49y o  year old female seen for outpatient PT evaluation with pain and mobility deficits due to cervical, thoracic and lumbar spine radiculopathy  Patient presents to PT IE with the following problems, concerns, deficits and impairments: cervical, thoracic and lumbar spine region pain, decreased cervical and lumbar spine regions range of motion, decreased bilateral ue and le rom and strength, + TTP, decrease in postural awareness, gait and stair dysfunctions, decrease in balance, functional limitations and decreased tolerance to activity  Patient would benefit from skilled PT services under the following PT treatment plan to address the above noted deficits: Pool based therapeutic exercises and activities to facilitate cervical and lumbar spine rom, modalities, manual therapy techniques, Kinesio taping techniques, postural reeducation and strengthening, DLS and abdominal strengthening, gait and stair training, balance and proprioception activities, IASTM techniques, Kinesio taping techniques, traction and a hep  Thank you for the referral      Goals  Short Term goals 4 - 6 weeks  1  Patient will be independent HEP  2   Patient will report a 25 - 50% decrease in pain complaints  3   Increase strength 1/2 grade  4   Increase ROM 5-10 degrees  Long Term goals 8 - 12 weeks  1  Patient will report elimination of pain complaints  2   Patient will return to all recreational activities without restriction  3   ROM WFL  4   Strength 5/5   5   Patient will report bending based functional activities improved > 25 %    6   Patient will report squatting based functional activities improved > 25 %   7   Patient will report reaching based functional activities improved > 25 %  8   Patient will report lifting based functional activities improved > 25 %  9   Patient will report standing based functional activities improved > 25 %  10   Patient will report dressing iadls and functional activities improved > 25 %  11   Patient will report sleep improved > 25 %  12   Patient will report walking improved by > 25 %  Plan  Patient would benefit from: skilled physical therapy  Planned modality interventions: cryotherapy, TENS, thermotherapy: hydrocollator packs, unattended electrical stimulation and traction  Planned therapy interventions: joint mobilization, manual therapy, massage, balance, balance/weight bearing training, muscle pump exercises, neuromuscular re-education, patient education, postural training, body mechanics training, self care, strengthening, stretching, therapeutic activities, therapeutic exercise, therapeutic training, transfer training, flexibility, functional ROM exercises, gait training, graded activity, graded exercise, home exercise program, graded motor, aquatic therapy and compression  Frequency: 2x week  Duration in weeks: 8  Treatment plan discussed with: patient        Subjective Evaluation    History of Present Illness  Mechanism of injury: Patient's PMHx is remarkable for sleep apnea, acute respiratory failure, pulmonary emphysema, acute encephalopathy, Bipolar disorder, generalized anxiety disorder, sob, right ankle fracture and multiple fall history    Pain  At best pain ratin  At worst pain rating: 10  Location: Lumbar and cervical spine regions, bilateral upper trapezius region pain    Patient Goals  Patient goals for therapy: decreased pain, improved balance, increased motion, increased strength and independence with ADLs/IADLs          Objective     Tenderness     Additional Tenderness Details  Patient exhibits protracted cervical spine posture at minimal levels  Patient is + TTP at cervical and thoracic spine paraspinal musculature as well as bilateral UT regions  Patient is + moderate to severe TTP at lumbar spine erector spinae musculature  Patient exhibits severe muscle guarding at lumbar spine erector spinae musculature that exhibits a reduction in lumbar spine lordosis  Active Range of Motion   Cervical/Thoracic Spine       Cervical    Flexion: 10 degrees  with pain  Extension: 10 degrees     with pain  Left lateral flexion: 10 degrees     with pain  Right lateral flexion: 10 degrees     with pain  Left rotation: 22 degrees with pain  Right rotation: 25 degrees    with pain  Left Shoulder   Flexion: 104 degrees with pain  Abduction: 65 degrees with pain    Right Shoulder   Flexion: 125 degrees with pain  Abduction: 75 degrees with pain    Left Elbow   Flexion: 115 degrees with pain  Extension: -15 degrees with pain    Right Elbow   Flexion: 110 degrees with pain  Extension: -6 degrees with pain    Lumbar   Flexion: 56 degrees  with pain  Extension: 10 degrees  with pain  Left lateral flexion: 18 degrees    with pain  Right lateral flexion: 20 degrees  with pain  Left rotation: 35 degrees  with pain  Right rotation: 40 degrees  with pain  Left Hip   Flexion: 92 degrees with pain  Abduction: 10 degrees with pain    Right Hip   Flexion: 96 degrees with pain  Abduction: 12 degrees with pain  Left Knee   Flexion: 96 degrees with pain  Extension: -8 degrees with pain  Extensor lag: Left knee extensor lag: unable  with pain    Right Knee   Flexion: 100 degrees with pain  Extension: -8 degrees with pain  Extensor la degrees with pain  Left Ankle/Foot   Dorsiflexion (ke): 4 degrees   Plantar flexion: 36 degrees with pain    Right Ankle/Foot   Dorsiflexion (ke): 2 degrees   Plantar flexion: 30 degrees with pain    Additional Active Range of Motion Details  All cervical spine arom is limited by pain aggravation      Strength/Myotome Testing     Left Shoulder Planes of Motion   Flexion: 3+   Abduction: 2-   External rotation at 0°: 3+   Internal rotation at 0°: 3+     Right Shoulder     Planes of Motion   Flexion: 3+   Abduction: 2-   External rotation at 0°: 3+   Internal rotation at 0°: 3+     Left Elbow   Flexion: 4-  Extension: 3+    Right Elbow   Flexion: 4-  Extension: 3+    Left Hip   Planes of Motion   Flexion: 4-  Extension: 4-  Abduction: 3+  Adduction: 4-    Right Hip   Planes of Motion   Flexion: 3+  Extension: 4-  Abduction: 3+  Adduction: 4-    Left Knee   Flexion: 4-  Extension: 3+    Right Knee   Flexion: 4-  Extension: 3+    Left Ankle/Foot   Dorsiflexion: 3+  Plantar flexion: 4    Right Ankle/Foot   Dorsiflexion: 3+  Plantar flexion: 4    Ambulation     Ambulation: Level Surfaces   Ambulation with assistive device: independent    Additional Level Surfaces Ambulation Details  Patient ambulates with spc with increase in base of support, decrease in pace, decrease in bilateral ankle PF with terminal stance phase  Ambulation: Stairs   Ascend stairs: independent  Pattern: non-reciprocal  Railings: two rails  Descend stairs: independent  Pattern: non-reciprocal  Railings: two rails    Comments   TUG is at 28 89 seconds  Limits of Stability Testing  Skill Level: Easy  Bilateral UE Support  Time to complete test: 48 seconds  Overall:          Actual:     43          Goal:     65  Forward:          Actual:     54          Goal:     65   Backward:          Actual:     59          Goal:     30  Left:          Actual:     46          Goal:     65  Right:          Actual:     54          Goal:     65  Forward / Left:          Actual:     37          Goal:     65  Forward / Right:          Actual:     40          Goal:     65   Backward / Left:          Actual:     62          Goal:     65   Backward / Right:          Actual:     50          Goal:     65                            Precautions: FALL HISTORY!     Patient's PMHx is remarkable for sleep apnea, acute respiratory failure, pulmonary emphysema, acute encephalopathy, Bipolar disorder, generalized anxiety disorder, sob, right ankle fracture and multiple fall history        Manual  12/11                                                                                 Exercise Diary  12/11            Water walking pre and post             SKTC stretch seated with strap:B:             Hamstring stretch seated with strap:B:             Gastrocnemius stretch with strap seated:B:             LAQ:B:             Hip flexion:B:             Seated HR and TR:B:             Cervical spine retraction             Cervical spine retraction with extension             Postural correction slough over correct             UT stretch:B:             LS stretch:B:                          ALL STANDING EXERCISES WITH UNILATERAL OF BILATERAL UE SUPPORT!!!                          SLR X 3:B:             HR and TR:B:             Mini squats             Lunges:B:             SLS and tandem stance:B:             Step ups:B:             Step downs:B:             Scapular squeezes             Paddle rows and horizontal adduction:B:             Pool pony hang                              Modalities  12/11

## 2019-12-11 NOTE — LETTER
2019    Geni Gupta MD  [address]  [address], [facility] [address]    Patient: Soy Lomax   YOB: 1965   Date of Visit: 2019     Encounter Diagnosis     ICD-10-CM    1  Lumbar radiculopathy M54 16    2  Radiculopathy of cervical spine M54 12    3  Radiculopathy, thoracic region M54 14        Dear Dr Jazmin Zendejas:    Thank you for your recent referral of Soy Lomax  Please review the attached evaluation summary from Malaika's recent visit  Please verify that you agree with the plan of care by signing the attached order  If you have any questions or concerns, please do not hesitate to call  I sincerely appreciate the opportunity to share in the care of one of your patients and hope to have another opportunity to work with you in the near future  Sincerely,    Bruna Becerril, PT      Referring Provider:      I certify that I have read the below Plan of Care and certify the need for these services furnished under this plan of treatment while under my care  Geni Gupta MD  [address]  [address] [address],[address], [place] [address] [address]l: [phone]          PT Evaluation     Today's date: 2019  Patient name: Soy Lomax  : 1965  MRN: [id number]  Referring provider: Vicente Rodgers MD  Dx:   Encounter Diagnosis     ICD-10-CM    1  Lumbar radiculopathy M54 16    2  Radiculopathy of cervical spine M54 12    3  Radiculopathy, thoracic region M54 14                   Assessment  Assessment details: PT IE: 2019  Patient reported she has the following symptoms: cervical, lumbar and bilateral scapular and UT region pain  Patient noted her most recent bout of injections were not successful in pain reduction thus they will hold injections currently  Patient noted as soon as she is standing for 5 minutes she has severe central Lumbar spine region pain    Patient noted she uses spc all the time for pain reduction  Patient noted she feels more stable and secure with NBQC than spc  Patient noted she has had multiple falls over the past year  Patient noted she fell in the spring of 2019 and fractured her right ankle  Patient noted she had a bout of left distal le cellulitis in the late spring / early summer  Patient noted she has bilateral CTS  Patient noted she has pain in posterior left knee and thigh pain  Patient noted her bilateral ue and le are weak due to cervical and lumbar spine weakness  Patient noted sleep is deprived due to cervical and lumbar spine pain  Patient noted walking and stair climbing are limited by cervical and lumbar spine pain and bilateral le weakness  Patient noted she has had PT in the past in which she had land based PT at [facility]  Patient noted bending, squatting, reaching, lifting, dressing and standing based functional activities are all limited by cervical and lumbar spine pain aggravation  Patient noted use of stronger pain medication as the most effective means of short term pain reduction  Patient noted noted she lies down to sleep in side lying with left more comfortable than right  Patient denies recent diagnostic testing  Patient noted MHP is effective in short term pain reduction  Impairments: abnormal gait, abnormal or restricted ROM, abnormal movement, impaired physical strength, pain with function and safety issue  Understanding of Dx/Px/POC: good   Prognosis: fair  Prognosis details: Patient is a 49y o  year old female seen for outpatient PT evaluation with pain and mobility deficits due to cervical, thoracic and lumbar spine radiculopathy   Patient presents to PT IE with the following problems, concerns, deficits and impairments: cervical, thoracic and lumbar spine region pain, decreased cervical and lumbar spine regions range of motion, decreased bilateral ue and le rom and strength, + TTP, decrease in postural awareness, gait and stair dysfunctions, decrease in balance, functional limitations and decreased tolerance to activity  Patient would benefit from skilled PT services under the following PT treatment plan to address the above noted deficits: Pool based therapeutic exercises and activities to facilitate cervical and lumbar spine rom, modalities, manual therapy techniques, Kinesio taping techniques, postural reeducation and strengthening, DLS and abdominal strengthening, gait and stair training, balance and proprioception activities, IASTM techniques, Kinesio taping techniques, traction and a hep  Thank you for the referral      Goals  Short Term goals 4 - 6 weeks  1  Patient will be independent HEP  2   Patient will report a 25 - 50% decrease in pain complaints  3   Increase strength 1/2 grade  4   Increase ROM 5-10 degrees  Long Term goals 8 - 12 weeks  1  Patient will report elimination of pain complaints  2   Patient will return to all recreational activities without restriction  3   ROM WFL  4   Strength 5/5   5   Patient will report bending based functional activities improved > 25 %  6   Patient will report squatting based functional activities improved > 25 %  7   Patient will report reaching based functional activities improved > 25 %  8   Patient will report lifting based functional activities improved > 25 %  9   Patient will report standing based functional activities improved > 25 %  10   Patient will report dressing iadls and functional activities improved > 25 %  11   Patient will report sleep improved > 25 %  12   Patient will report walking improved by > 25 %      Plan  Patient would benefit from: skilled physical therapy  Planned modality interventions: cryotherapy, TENS, thermotherapy: hydrocollator packs, unattended electrical stimulation and traction  Planned therapy interventions: joint mobilization, manual therapy, massage, balance, balance/weight bearing training, muscle pump exercises, neuromuscular re-education, patient education, postural training, body mechanics training, self care, strengthening, stretching, therapeutic activities, therapeutic exercise, therapeutic training, transfer training, flexibility, functional ROM exercises, gait training, graded activity, graded exercise, home exercise program, graded motor, aquatic therapy and compression  Frequency: 2x week  Duration in weeks: 8  Treatment plan discussed with: patient        Subjective Evaluation    History of Present Illness  Mechanism of injury: Patient's PMHx is remarkable for sleep apnea, acute respiratory failure, pulmonary emphysema, acute encephalopathy, Bipolar disorder, generalized anxiety disorder, sob, right ankle fracture and multiple fall history  Pain  At best pain ratin  At worst pain rating: 10  Location: Lumbar and cervical spine regions, bilateral upper trapezius region pain    Patient Goals  Patient goals for therapy: decreased pain, improved balance, increased motion, increased strength and independence with ADLs/IADLs          Objective     Tenderness     Additional Tenderness Details  Patient exhibits protracted cervical spine posture at minimal levels  Patient is + TTP at cervical and thoracic spine paraspinal musculature as well as bilateral UT regions  Patient is + moderate to severe TTP at lumbar spine erector spinae musculature  Patient exhibits severe muscle guarding at lumbar spine erector spinae musculature that exhibits a reduction in lumbar spine lordosis      Active Range of Motion   Cervical/Thoracic Spine       Cervical    Flexion: 10 degrees  with pain  Extension: 10 degrees     with pain  Left lateral flexion: 10 degrees     with pain  Right lateral flexion: 10 degrees     with pain  Left rotation: 22 degrees with pain  Right rotation: 25 degrees    with pain  Left Shoulder   Flexion: 104 degrees with pain  Abduction: 65 degrees with pain    Right Shoulder   Flexion: 125 degrees with pain  Abduction: 75 degrees with pain    Left Elbow   Flexion: 115 degrees with pain  Extension: -15 degrees with pain    Right Elbow   Flexion: 110 degrees with pain  Extension: -6 degrees with pain    Lumbar   Flexion: 56 degrees  with pain  Extension: 10 degrees  with pain  Left lateral flexion: 18 degrees    with pain  Right lateral flexion: 20 degrees  with pain  Left rotation: 35 degrees  with pain  Right rotation: 40 degrees  with pain  Left Hip   Flexion: 92 degrees with pain  Abduction: 10 degrees with pain    Right Hip   Flexion: 96 degrees with pain  Abduction: 12 degrees with pain  Left Knee   Flexion: 96 degrees with pain  Extension: -8 degrees with pain  Extensor lag: Left knee extensor lag: unable  with pain    Right Knee   Flexion: 100 degrees with pain  Extension: -8 degrees with pain  Extensor la degrees with pain  Left Ankle/Foot   Dorsiflexion (ke): 4 degrees   Plantar flexion: 36 degrees with pain    Right Ankle/Foot   Dorsiflexion (ke): 2 degrees   Plantar flexion: 30 degrees with pain    Additional Active Range of Motion Details  All cervical spine arom is limited by pain aggravation      Strength/Myotome Testing     Left Shoulder     Planes of Motion   Flexion: 3+   Abduction: 2-   External rotation at 0°:  3+   Internal rotation at 0°:  3+     Right Shoulder     Planes of Motion   Flexion: 3+   Abduction: 2-   External rotation at 0°:  3+   Internal rotation at 0°:  3+     Left Elbow   Flexion: 4-  Extension: 3+    Right Elbow   Flexion: 4-  Extension: 3+    Left Hip   Planes of Motion   Flexion: 4-  Extension: 4-  Abduction: 3+  Adduction: 4-    Right Hip   Planes of Motion   Flexion: 3+  Extension: 4-  Abduction: 3+  Adduction: 4-    Left Knee   Flexion: 4-  Extension: 3+    Right Knee   Flexion: 4-  Extension: 3+    Left Ankle/Foot   Dorsiflexion: 3+  Plantar flexion: 4    Right Ankle/Foot   Dorsiflexion: 3+  Plantar flexion: 4    Ambulation     Ambulation: Level Surfaces Ambulation with assistive device: independent    Additional Level Surfaces Ambulation Details  Patient ambulates with spc with increase in base of support, decrease in pace, decrease in bilateral ankle PF with terminal stance phase  Ambulation: Stairs   Ascend stairs: independent  Pattern: non-reciprocal  Railings: two rails  Descend stairs: independent  Pattern: non-reciprocal  Railings: two rails    Comments   TUG is at 28 89 seconds  Limits of Stability Testing  Skill Level: Easy  Bilateral UE Support  Time to complete test: 48 seconds  Overall:          Actual:     43          Goal:     65  Forward:          Actual:     54          Goal:     65   Backward:          Actual:     59          Goal:     30  Left:          Actual:     46          Goal:     65  Right:          Actual:     54          Goal:     65  Forward / Left:          Actual:     37          Goal:     65  Forward / Right:          Actual:     40          Goal:     65   Backward / Left:          Actual:     62          Goal:     65   Backward / Right:          Actual:     50          Goal:     65                            Precautions: FALL HISTORY! Patient's PMHx is remarkable for sleep apnea, acute respiratory failure, pulmonary emphysema, acute encephalopathy, Bipolar disorder, generalized anxiety disorder, sob, right ankle fracture and multiple fall history        Manual  12/11                                                                                 Exercise Diary  12/11            Water walking pre and post             SKTC stretch seated with strap:B:             Hamstring stretch seated with strap:B:             Gastrocnemius stretch with strap seated:B:             LAQ:B:             Hip flexion:B:             Seated HR and TR:B:             Cervical spine retraction             Cervical spine retraction with extension             Postural correction slough over correct             UT stretch:B:             LS stretch:B:                          ALL STANDING EXERCISES WITH UNILATERAL OF BILATERAL UE SUPPORT!!!                          SLR X 3:B:             HR and TR:B:             Mini squats             Lunges:B:             SLS and tandem stance:B:             Step ups:B:             Step downs:B:             Scapular squeezes             Paddle rows and horizontal adduction:B:             Pool pony hang                              Modalities  12/11

## 2019-12-20 ENCOUNTER — APPOINTMENT (OUTPATIENT)
Dept: PHYSICAL THERAPY | Age: 54
End: 2019-12-20
Payer: COMMERCIAL

## 2020-01-03 ENCOUNTER — EVALUATION (OUTPATIENT)
Dept: PHYSICAL THERAPY | Age: 55
End: 2020-01-03
Payer: COMMERCIAL

## 2020-01-03 DIAGNOSIS — M54.14 RADICULOPATHY, THORACIC REGION: ICD-10-CM

## 2020-01-03 DIAGNOSIS — M54.12 RADICULOPATHY OF CERVICAL SPINE: ICD-10-CM

## 2020-01-03 DIAGNOSIS — M54.16 LUMBAR RADICULOPATHY: Primary | ICD-10-CM

## 2020-01-03 PROCEDURE — 97113 AQUATIC THERAPY/EXERCISES: CPT | Performed by: PHYSICAL THERAPIST

## 2020-01-03 NOTE — PROGRESS NOTES
PT Evaluation / PT Reassessment    Today's date: 1/3/2020  Patient name: Isaac Elizabeth  : 1965  MRN: [id number]  Referring provider: Sharla Ryan MD  Dx:   Encounter Diagnosis     ICD-10-CM    1  Lumbar radiculopathy M54 16    2  Radiculopathy of cervical spine M54 12    3  Radiculopathy, thoracic region M54 14                   Assessment  Assessment details: PT Reassessment: 1-3-2020  Patient reported she was unable to resume [facility] until now due to transportation issues  But, she noted now she will utilize [employer]  Patient reported she returns to PT on reassessment with the following deficits: cervical, lumbar and bilateral scapular and UT region pain that is constant  Patient noted the following deficits are due to cervical, lumbar and bilateral scapular and UT region pain: house hold iadls like cooking, cleaning, sweeping, washing dishes, reaching and lifting activities, walking, static standing activities  Patient noted she spc with all standing and ambulation based functional activities  Patient noted her bilateral ue and le remain weak and also limit her functional activities  Patient noted sleep is deprived as well due to cervical, lumbar and bilateral scapular and UT region pain  Patient noted intermittent bilateral hand or feet paresthesias  PT IE: 2019  Patient reported she has the following symptoms: cervical, lumbar and bilateral scapular and UT region pain  Patient noted her most recent bout of injections were not successful in pain reduction thus they will hold injections currently  Patient noted as soon as she is standing for 5 minutes she has severe central Lumbar spine region pain  Patient noted she uses spc all the time for pain reduction  Patient noted she feels more stable and secure with NBQC than spc  Patient noted she has had multiple falls over the past year  Patient noted she fell in the spring of 2019 and fractured her right ankle  Patient noted she had a bout of left distal le cellulitis in the late spring / early summer  Patient noted she has bilateral CTS  Patient noted she has pain in posterior left knee and thigh pain  Patient noted her bilateral ue and le are weak due to cervical and lumbar spine weakness  Patient noted sleep is deprived due to cervical and lumbar spine pain  Patient noted walking and stair climbing are limited by cervical and lumbar spine pain and bilateral le weakness  Patient noted she has had PT in the past in which she had land based PT at [facility]  Patient noted bending, squatting, reaching, lifting, dressing and standing based functional activities are all limited by cervical and lumbar spine pain aggravation  Patient noted use of stronger pain medication as the most effective means of short term pain reduction  Patient noted noted she lies down to sleep in side lying with left more comfortable than right  Patient denies recent diagnostic testing  Patient noted MHP is effective in short term pain reduction  Impairments: abnormal gait, abnormal or restricted ROM, abnormal movement, impaired physical strength, pain with function and safety issue  Understanding of Dx/Px/POC: good   Prognosis: fair  Prognosis details: Patient is a 49y o  year old female seen for outpatient PT reevaluation with pain and mobility deficits due to cervical, thoracic and lumbar spine radiculopathy  Patient presents to PT IE with the following problems, concerns, deficits and impairments: cervical, thoracic and lumbar spine region pain, decreased cervical and lumbar spine regions range of motion, decreased bilateral ue and le rom and strength, + TTP, decrease in postural awareness, gait and stair dysfunctions, decrease in balance, functional limitations and decreased tolerance to activity    Patient would benefit from skilled PT services under the following PT treatment plan to address the above noted deficits: Pool based therapeutic exercises and activities to facilitate cervical and lumbar spine rom, modalities, manual therapy techniques, Kinesio taping techniques, postural reeducation and strengthening, DLS and abdominal strengthening, gait and stair training, balance and proprioception activities, IASTM techniques, Kinesio taping techniques, traction and a hep  Thank you for the referral      Goals  Short Term goals 4 - 6 weeks  1  Patient will be independent HEP  2   Patient will report a 25 - 50% decrease in pain complaints  3   Increase strength 1/2 grade  4   Increase ROM 5-10 degrees  Long Term goals 8 - 12 weeks  1  Patient will report elimination of pain complaints  2   Patient will return to all recreational activities without restriction  3   ROM WFL  4   Strength 5/5   5   Patient will report bending based functional activities improved > 25 %  6   Patient will report squatting based functional activities improved > 25 %  7   Patient will report reaching based functional activities improved > 25 %  8   Patient will report lifting based functional activities improved > 25 %  9   Patient will report standing based functional activities improved > 25 %  10   Patient will report dressing iadls and functional activities improved > 25 %  11   Patient will report sleep improved > 25 %  12   Patient will report walking improved by > 25 %      Plan  Patient would benefit from: skilled physical therapy  Planned modality interventions: cryotherapy, TENS, thermotherapy: hydrocollator packs, unattended electrical stimulation and traction  Planned therapy interventions: joint mobilization, manual therapy, massage, balance, balance/weight bearing training, muscle pump exercises, neuromuscular re-education, patient education, postural training, body mechanics training, self care, strengthening, stretching, therapeutic activities, therapeutic exercise, therapeutic training, transfer training, flexibility, functional ROM exercises, gait training, graded activity, graded exercise, home exercise program, graded motor, aquatic therapy and compression  Frequency: 2x week  Duration in weeks: 8  Treatment plan discussed with: patient        Subjective Evaluation    History of Present Illness  Mechanism of injury: Patient's PMHx is remarkable for sleep apnea, acute respiratory failure, pulmonary emphysema, acute encephalopathy, Bipolar disorder, generalized anxiety disorder, sob, right ankle fracture and multiple fall history  Pain  At best pain ratin  At worst pain rating: 10  Location: Lumbar and cervical spine regions, bilateral upper trapezius region pain    Patient Goals  Patient goals for therapy: decreased pain, improved balance, increased motion, increased strength and independence with ADLs/IADLs          Objective     Tenderness     Additional Tenderness Details  Patient exhibits protracted cervical spine posture at minimal to moderate levels  Patient is + TTP at cervical and thoracic spine paraspinal musculature as well as bilateral UT regions at moderate to severe levels  Patient is + moderate to severe TTP at lumbar spine erector spinae musculature  Patient exhibits severe muscle guarding at lumbar spine erector spinae musculature that exhibits a reduction in lumbar spine lordosis      Active Range of Motion   Cervical/Thoracic Spine       Cervical    Flexion: 15 degrees  with pain  Extension: 15 degrees     with pain  Left lateral flexion: 10 degrees     with pain  Right lateral flexion: 10 degrees     with pain  Left rotation: 30 degrees with pain  Right rotation: 34 degrees    with pain  Left Shoulder   Flexion: 104 degrees with pain  Abduction: 80 degrees with pain    Right Shoulder   Flexion: 108 degrees with pain  Abduction: 80 degrees with pain    Left Elbow   Flexion: 125 degrees with pain  Extension: -12 degrees with pain    Right Elbow   Flexion: 118 degrees with pain  Extension: -10 degrees with pain    Lumbar   Flexion: 52 degrees  with pain  Extension: 12 degrees  with pain  Left lateral flexion: 15 degrees    with pain  Right lateral flexion: 20 degrees  with pain  Left rotation: 30 degrees  with pain  Right rotation: 25 degrees  with pain  Left Hip   Flexion: 94 degrees with pain  Abduction: 10 degrees with pain    Right Hip   Flexion: 96 degrees with pain  Abduction: 12 degrees with pain  Left Knee   Flexion: 100 degrees with pain  Extension: -8 degrees with pain  Extensor lag: 15 (unable) degrees with pain    Right Knee   Flexion: 102 degrees with pain  Extension: -8 degrees with pain  Extensor la degrees with pain  Left Ankle/Foot   Dorsiflexion (ke): 4 degrees   Plantar flexion: 36 degrees with pain    Right Ankle/Foot   Dorsiflexion (ke): 2 degrees   Plantar flexion: 30 degrees with pain    Additional Active Range of Motion Details  All cervical spine arom is limited by pain aggravation      Strength/Myotome Testing     Left Shoulder     Planes of Motion   Flexion: 3+   Abduction: 3-   External rotation at 0°: 3+   Internal rotation at 0°: 3+     Right Shoulder     Planes of Motion   Flexion: 3+   Abduction: 3-   External rotation at 0°: 3+   Internal rotation at 0°: 3+     Left Elbow   Flexion: 4-  Extension: 3+    Right Elbow   Flexion: 4-  Extension: 3+    Left Hip   Planes of Motion   Flexion: 4-  Extension: 4-  Abduction: 3+  Adduction: 4-    Right Hip   Planes of Motion   Flexion: 3+  Extension: 4-  Abduction: 3+  Adduction: 4-    Left Knee   Flexion: 4-  Extension: 3+    Right Knee   Flexion: 4-  Extension: 3+    Left Ankle/Foot   Dorsiflexion: 3+  Plantar flexion: 4    Right Ankle/Foot   Dorsiflexion: 3+  Plantar flexion: 4    Ambulation     Ambulation: Level Surfaces   Ambulation with assistive device: independent    Additional Level Surfaces Ambulation Details  Patient ambulates with spc with increase in base of support, decrease in pace, decrease in bilateral ankle PF with terminal stance phase and decrease in right stance phase left swing phase in an antalgic manner  Ambulation: Stairs   Ascend stairs: independent  Pattern: non-reciprocal  Railings: two rails  Descend stairs: independent  Pattern: non-reciprocal  Railings: two rails    Comments   TUG is at 22 98 seconds  Precautions: FALL HISTORY! Patient's PMHx is remarkable for sleep apnea, acute respiratory failure, pulmonary emphysema, acute encephalopathy, Bipolar disorder, generalized anxiety disorder, sob, right ankle fracture and multiple fall history  Manual  12/11                                                                               Patient may require hand held assistance in and out of the pool! Keep patient at the bench side of the pool!     Exercise Diary  12/11 1-3-20           Water walking pre and post  At bench 3 x           SKTC stretch seated with strap:B:  10 sec x 5           Hamstring stretch seated with strap:B:  10 sec x 5           Gastrocnemius stretch with strap seated:B:  NT           LAQ:B:  10 x 2           Hip flexion:B:  10 x            Seated HR and TR:B:  2 x 10           Cervical spine retraction  10 x            Cervical spine retraction with extension  NT           Postural correction slough over correct  3 sec x 10           UT stretch:B:  No over pressure: 10 sec x 5           LS stretch:B:  10 sec x 5 with out over pressure                        ALL STANDING EXERCISES WITH UNILATERAL OF BILATERAL UE SUPPORT!!!                          SLR X 3:B:  10 x           HR and TR:B:  10 x           Mini squats  10 x           Lunges:B:  NT           SLS and tandem stance:B:  NT           Step ups:B:  NT           Step downs:B:  NT           Scapular squeezes  NT           Paddle rows and horizontal adduction:B:  10 x            Pool pony hang  2 min                            Modalities  12/11

## 2020-01-06 ENCOUNTER — APPOINTMENT (OUTPATIENT)
Dept: PHYSICAL THERAPY | Age: 55
End: 2020-01-06
Payer: COMMERCIAL

## 2020-01-08 ENCOUNTER — OFFICE VISIT (OUTPATIENT)
Dept: PHYSICAL THERAPY | Age: 55
End: 2020-01-08
Payer: COMMERCIAL

## 2020-01-08 DIAGNOSIS — M54.16 LUMBAR RADICULOPATHY: Primary | ICD-10-CM

## 2020-01-08 DIAGNOSIS — M54.14 RADICULOPATHY, THORACIC REGION: ICD-10-CM

## 2020-01-08 DIAGNOSIS — M54.12 RADICULOPATHY OF CERVICAL SPINE: ICD-10-CM

## 2020-01-08 PROCEDURE — 97113 AQUATIC THERAPY/EXERCISES: CPT

## 2020-01-08 NOTE — PROGRESS NOTES
Daily Note     Today's date: 2020  Patient name: Mansi Sierra  : 1965  MRN: [id number]  Referring provider: Daniel Mauro MD  Dx:   Encounter Diagnosis     ICD-10-CM    1  Lumbar radiculopathy M54 16    2  Radiculopathy of cervical spine M54 12    3  Radiculopathy, thoracic region M54 14                   Subjective: Patient reported cervical,thoracic and lumbar spine region pain is at 8 of 10, and she has multiple region muscle soreness that persists since onset of pool based PT on last PT visit  Objective: See treatment diary below      Assessment: decreased pain only while in [facility] environment   Progress as able   Plan: Cont with Plan of care      Precautions: FALL HISTORY! Patient's PMHx is remarkable for sleep apnea, acute respiratory failure, pulmonary emphysema, acute encephalopathy, Bipolar disorder, generalized anxiety disorder, sob, right ankle fracture and multiple fall history  Manual                                                                                 Patient may require hand held assistance in and out of the pool! Keep patient at the bench side of the pool!     Exercise Diary  12/11 1-3-20 1/8/20          Water walking pre and post  At bench 3 x Around pool x 3          SKTC stretch seated with strap:B:  10 sec x 5 10 sec x 5          Hamstring stretch seated with strap:B:  10 sec x 5 10 sec x 5          Gastrocnemius stretch with strap seated:B:  NT NT          LAQ:B:  10 x 2 20x           Hip flexion:B:  10 x  20x           Seated HR and TR:B:  2 x 10 NT           Cervical spine retraction  10 x  10x           Cervical spine retraction with extension  NT 10x           Postural correction slough over correct  3 sec x 10 20x           UT stretch:B:  No over pressure: 10 sec x 5 20sec 5x           LS stretch:B:  10 sec x 5 with out over pressure 20sec 5x B                        ALL STANDING EXERCISES WITH UNILATERAL OF BILATERAL UE SUPPORT!!! SLR X 3:B:  10 x 10x           HR and TR:B:  10 x 10x           Mini squats  10 x 10x           Lunges:B:  NT NT           SLS and tandem stance:B:  NT NT           Step ups:B:  NT           Step downs:B:  NT NT           Scapular squeezes  NT           Paddle rows and horizontal adduction:B:  10 x  10x           Pool pony hang  2 min 4 min                            Modalities  12/11

## 2020-01-09 ENCOUNTER — APPOINTMENT (OUTPATIENT)
Dept: PHYSICAL THERAPY | Age: 55
End: 2020-01-09
Payer: COMMERCIAL

## 2020-01-10 ENCOUNTER — APPOINTMENT (OUTPATIENT)
Dept: PHYSICAL THERAPY | Age: 55
End: 2020-01-10
Payer: COMMERCIAL

## 2020-01-13 ENCOUNTER — APPOINTMENT (OUTPATIENT)
Dept: PHYSICAL THERAPY | Age: 55
End: 2020-01-13
Payer: COMMERCIAL

## 2020-01-15 ENCOUNTER — OFFICE VISIT (OUTPATIENT)
Dept: PHYSICAL THERAPY | Age: 55
End: 2020-01-15
Payer: COMMERCIAL

## 2020-01-15 DIAGNOSIS — M54.14 RADICULOPATHY, THORACIC REGION: ICD-10-CM

## 2020-01-15 DIAGNOSIS — M54.12 RADICULOPATHY OF CERVICAL SPINE: ICD-10-CM

## 2020-01-15 DIAGNOSIS — M54.16 LUMBAR RADICULOPATHY: Primary | ICD-10-CM

## 2020-01-15 PROCEDURE — 97113 AQUATIC THERAPY/EXERCISES: CPT

## 2020-01-15 NOTE — PROGRESS NOTES
Daily Note     Today's date: 1/15/2020  Patient name: Ivelisse Watkins  : 1965  MRN: [id number]  Referring provider: Leatha Mccall MD  Dx:   Encounter Diagnosis     ICD-10-CM    1  Lumbar radiculopathy M54 16    2  Radiculopathy of cervical spine M54 12    3  Radiculopathy, thoracic region M54 14                   Subjective: LB pain increase over the last few days  Pt to call MD regarding redness at foot  Objective: See treatment diary below      Assessment: Improved tolerance to exercises and improved LB mobility, no change in pain status  Pt appeared fatigued and lethargic at times  Plan: Cont with Plan of care      Precautions: FALL HISTORY! Patient's PMHx is remarkable for sleep apnea, acute respiratory failure, pulmonary emphysema, acute encephalopathy, Bipolar disorder, generalized anxiety disorder, sob, right ankle fracture and multiple fall history  Manual                                                                                 Patient may require hand held assistance in and out of the pool! Keep patient at the bench side of the pool!     Exercise Diary  12/11 1-3-20 1/8/20 1/15         Water walking pre and post  At bench 3 x Around pool x 3 3x          SKTC stretch seated with strap:B:  10 sec x 5 10 sec x 5 NT          Hamstring stretch seated with strap:B:  10 sec x 5 10 sec x 5 NT          Gastrocnemius stretch with strap seated:B:  NT NT NT          LAQ:B:  10 x 2 20x  20x          Hip flexion:B:  10 x  20x  20x          Seated HR and TR:B:  2 x 10 NT  20x          Cervical spine retraction  10 x  10x  20x          Cervical spine retraction with extension  NT 10x  NT          Postural correction slough over correct  3 sec x 10 20x  20x          UT stretch:B:  No over pressure: 10 sec x 5 20sec 5x  20sec 5x          LS stretch:B:  10 sec x 5 with out over pressure 20sec 5x B  NT                       ALL STANDING EXERCISES WITH UNILATERAL OF BILATERAL UE SUPPORT!!!                          SLR X 3:B:  10 x 10x  2x          HR and TR:B:  10 x 10x  20x          Mini squats  10 x 10x  20x          Lunges:B:  NT NT  NT          SLS and tandem stance:B:  NT NT  NT          Step ups:B:  NT  NT         Step downs:B:  NT NT  NT          Scapular squeezes  NT  20x 3sec          Paddle rows and horizontal adduction:B:  10 x  10x  NT          Pool pony hang  2 min 4 min  NT                           Modalities  12/11

## 2020-01-16 ENCOUNTER — APPOINTMENT (OUTPATIENT)
Dept: PHYSICAL THERAPY | Age: 55
End: 2020-01-16
Payer: COMMERCIAL

## 2020-01-17 ENCOUNTER — APPOINTMENT (OUTPATIENT)
Dept: PHYSICAL THERAPY | Age: 55
End: 2020-01-17
Payer: COMMERCIAL

## 2020-07-09 ENCOUNTER — TRANSCRIBE ORDERS (OUTPATIENT)
Dept: LAB | Facility: HOSPITAL | Age: 55
End: 2020-07-09

## 2020-07-09 ENCOUNTER — LAB (OUTPATIENT)
Dept: LAB | Facility: HOSPITAL | Age: 55
End: 2020-07-09
Payer: COMMERCIAL

## 2020-07-09 DIAGNOSIS — F39 MILD MOOD DISORDER (HCC): ICD-10-CM

## 2020-07-09 DIAGNOSIS — M79.7 SCAPULOHUMERAL FIBROSITIS: ICD-10-CM

## 2020-07-09 DIAGNOSIS — R53.83 FATIGUE DUE TO DEPRESSION: Primary | ICD-10-CM

## 2020-07-09 DIAGNOSIS — F32.A FATIGUE DUE TO DEPRESSION: Primary | ICD-10-CM

## 2020-07-09 DIAGNOSIS — E55.9 AVITAMINOSIS D: ICD-10-CM

## 2020-07-09 DIAGNOSIS — M15.9 GENERALIZED OSTEOARTHROSIS, INVOLVING MULTIPLE SITES: ICD-10-CM

## 2020-07-09 DIAGNOSIS — M79.89 LEG SWELLING: ICD-10-CM

## 2020-07-09 DIAGNOSIS — R53.83 FATIGUE DUE TO DEPRESSION: ICD-10-CM

## 2020-07-09 DIAGNOSIS — Z00.00 ROUTINE ADULT HEALTH MAINTENANCE: ICD-10-CM

## 2020-07-09 DIAGNOSIS — F32.A FATIGUE DUE TO DEPRESSION: ICD-10-CM

## 2020-07-09 LAB
25(OH)D3 SERPL-MCNC: 83.6 NG/ML (ref 30–100)
ALBUMIN SERPL BCP-MCNC: 4.1 G/DL (ref 3–5.2)
ALP SERPL-CCNC: 59 U/L (ref 43–122)
ALT SERPL W P-5'-P-CCNC: 11 U/L (ref 9–52)
ANION GAP SERPL CALCULATED.3IONS-SCNC: 6 MMOL/L (ref 5–14)
AST SERPL W P-5'-P-CCNC: 22 U/L (ref 14–36)
BASOPHILS # BLD AUTO: 0 THOUSANDS/ΜL (ref 0–0.1)
BASOPHILS NFR BLD AUTO: 1 % (ref 0–1)
BILIRUB SERPL-MCNC: 0.7 MG/DL
BUN SERPL-MCNC: 17 MG/DL (ref 5–25)
CALCIUM SERPL-MCNC: 9.7 MG/DL (ref 8.4–10.2)
CHLORIDE SERPL-SCNC: 101 MMOL/L (ref 97–108)
CHOLEST SERPL-MCNC: 187 MG/DL
CO2 SERPL-SCNC: 33 MMOL/L (ref 22–30)
CREAT SERPL-MCNC: 0.88 MG/DL (ref 0.6–1.2)
CRP SERPL QL: <5 MG/L
EOSINOPHIL # BLD AUTO: 0.1 THOUSAND/ΜL (ref 0–0.4)
EOSINOPHIL NFR BLD AUTO: 2 % (ref 0–6)
ERYTHROCYTE [DISTWIDTH] IN BLOOD BY AUTOMATED COUNT: 17.4 %
ERYTHROCYTE [SEDIMENTATION RATE] IN BLOOD: 4 MM/HOUR (ref 1–20)
GFR SERPL CREATININE-BSD FRML MDRD: 75 ML/MIN/1.73SQ M
GLUCOSE P FAST SERPL-MCNC: 89 MG/DL (ref 70–99)
HCT VFR BLD AUTO: 44.2 % (ref 36–46)
HDLC SERPL-MCNC: 47 MG/DL
HGB BLD-MCNC: 14.7 G/DL (ref 12–16)
LDLC SERPL CALC-MCNC: 114 MG/DL
LYMPHOCYTES # BLD AUTO: 1.9 THOUSANDS/ΜL (ref 0.5–4)
LYMPHOCYTES NFR BLD AUTO: 41 % (ref 25–45)
MCH RBC QN AUTO: 30.4 PG (ref 26–34)
MCHC RBC AUTO-ENTMCNC: 33.3 G/DL (ref 31–36)
MCV RBC AUTO: 91 FL (ref 80–100)
MONOCYTES # BLD AUTO: 0.4 THOUSAND/ΜL (ref 0.2–0.9)
MONOCYTES NFR BLD AUTO: 10 % (ref 1–10)
NEUTROPHILS # BLD AUTO: 2.1 THOUSANDS/ΜL (ref 1.8–7.8)
NEUTS SEG NFR BLD AUTO: 46 % (ref 45–65)
NONHDLC SERPL-MCNC: 140 MG/DL
PLATELET # BLD AUTO: 153 THOUSANDS/UL (ref 150–450)
PMV BLD AUTO: 9.8 FL (ref 8.9–12.7)
POTASSIUM SERPL-SCNC: 4.1 MMOL/L (ref 3.6–5)
PROT SERPL-MCNC: 7 G/DL (ref 5.9–8.4)
RBC # BLD AUTO: 4.84 MILLION/UL (ref 4–5.2)
SODIUM SERPL-SCNC: 140 MMOL/L (ref 137–147)
T3FREE SERPL-MCNC: 2.65 PG/ML (ref 2.3–4.2)
T4 FREE SERPL-MCNC: 0.83 NG/DL (ref 0.76–1.46)
TRIGL SERPL-MCNC: 129 MG/DL
TSH SERPL DL<=0.05 MIU/L-ACNC: 2.02 UIU/ML (ref 0.47–4.68)
VALPROATE SERPL-MCNC: 70.6 UG/ML (ref 50–120)
WBC # BLD AUTO: 4.6 THOUSAND/UL (ref 4.5–11)

## 2020-07-09 PROCEDURE — 84439 ASSAY OF FREE THYROXINE: CPT

## 2020-07-09 PROCEDURE — 80061 LIPID PANEL: CPT

## 2020-07-09 PROCEDURE — 82306 VITAMIN D 25 HYDROXY: CPT

## 2020-07-09 PROCEDURE — 85652 RBC SED RATE AUTOMATED: CPT

## 2020-07-09 PROCEDURE — 86140 C-REACTIVE PROTEIN: CPT

## 2020-07-09 PROCEDURE — 84481 FREE ASSAY (FT-3): CPT

## 2020-07-09 PROCEDURE — 85025 COMPLETE CBC W/AUTO DIFF WBC: CPT

## 2020-07-09 PROCEDURE — 84443 ASSAY THYROID STIM HORMONE: CPT

## 2020-07-09 PROCEDURE — 80164 ASSAY DIPROPYLACETIC ACD TOT: CPT

## 2020-07-09 PROCEDURE — 80053 COMPREHEN METABOLIC PANEL: CPT

## 2020-07-09 PROCEDURE — 36415 COLL VENOUS BLD VENIPUNCTURE: CPT
